# Patient Record
Sex: FEMALE | URBAN - METROPOLITAN AREA
[De-identification: names, ages, dates, MRNs, and addresses within clinical notes are randomized per-mention and may not be internally consistent; named-entity substitution may affect disease eponyms.]

---

## 2022-04-20 ENCOUNTER — APPOINTMENT (OUTPATIENT)
Age: 32
Setting detail: DERMATOLOGY
End: 2022-04-20

## 2022-04-20 ENCOUNTER — APPOINTMENT (RX ONLY)
Dept: URBAN - METROPOLITAN AREA CLINIC 75 | Facility: CLINIC | Age: 32
Setting detail: DERMATOLOGY
End: 2022-04-20

## 2022-04-20 ENCOUNTER — APPOINTMENT (RX ONLY)
Dept: URBAN - METROPOLITAN AREA CLINIC 167 | Facility: CLINIC | Age: 32
Setting detail: DERMATOLOGY
End: 2022-04-20

## 2022-04-20 DIAGNOSIS — L20.89 OTHER ATOPIC DERMATITIS: ICD-10-CM

## 2022-04-20 DIAGNOSIS — L20.89 OTHER ATOPIC DERMATITIS: ICD-10-CM | Status: INADEQUATELY CONTROLLED

## 2022-04-20 PROBLEM — L20.84 INTRINSIC (ALLERGIC) ECZEMA: Status: ACTIVE | Noted: 2022-04-20

## 2022-04-20 PROCEDURE — 99214 OFFICE O/P EST MOD 30 MIN: CPT

## 2022-04-20 PROCEDURE — ? PRESCRIPTION MEDICATION MANAGEMENT

## 2022-04-20 PROCEDURE — ? ADDITIONAL NOTES

## 2022-04-20 PROCEDURE — ? PRESCRIPTION

## 2022-04-20 PROCEDURE — ? COUNSELING

## 2022-04-20 RX ORDER — TACROLIMUS 1 MG/G
OINTMENT TOPICAL BID
Qty: 60 | Refills: 1 | Status: ERX

## 2022-04-20 ASSESSMENT — LOCATION SIMPLE DESCRIPTION DERM
LOCATION SIMPLE: UPPER BACK
LOCATION SIMPLE: UPPER BACK
LOCATION SIMPLE: LEFT THIGH
LOCATION SIMPLE: LEFT THIGH
LOCATION SIMPLE: LEFT BREAST
LOCATION SIMPLE: RIGHT UPPER ARM
LOCATION SIMPLE: RIGHT UPPER ARM
LOCATION SIMPLE: LEFT UPPER ARM
LOCATION SIMPLE: LEFT THIGH
LOCATION SIMPLE: RIGHT THIGH
LOCATION SIMPLE: RIGHT THIGH
LOCATION SIMPLE: LEFT UPPER ARM
LOCATION SIMPLE: UPPER BACK
LOCATION SIMPLE: RIGHT THIGH
LOCATION SIMPLE: RIGHT UPPER ARM
LOCATION SIMPLE: LEFT BREAST
LOCATION SIMPLE: LEFT BREAST
LOCATION SIMPLE: LEFT UPPER ARM

## 2022-04-20 ASSESSMENT — LOCATION DETAILED DESCRIPTION DERM
LOCATION DETAILED: LEFT ANTERIOR PROXIMAL THIGH
LOCATION DETAILED: INFERIOR THORACIC SPINE
LOCATION DETAILED: RIGHT ANTERIOR DISTAL UPPER ARM
LOCATION DETAILED: RIGHT ANTERIOR DISTAL THIGH
LOCATION DETAILED: RIGHT ANTERIOR DISTAL THIGH
LOCATION DETAILED: INFERIOR THORACIC SPINE
LOCATION DETAILED: LEFT ANTERIOR DISTAL UPPER ARM
LOCATION DETAILED: LEFT ANTERIOR PROXIMAL THIGH
LOCATION DETAILED: LEFT MEDIAL BREAST 10-11:00 REGION
LOCATION DETAILED: RIGHT ANTERIOR DISTAL THIGH
LOCATION DETAILED: LEFT ANTERIOR DISTAL UPPER ARM
LOCATION DETAILED: INFERIOR THORACIC SPINE
LOCATION DETAILED: LEFT ANTERIOR PROXIMAL THIGH
LOCATION DETAILED: LEFT MEDIAL BREAST 10-11:00 REGION
LOCATION DETAILED: LEFT ANTERIOR DISTAL UPPER ARM
LOCATION DETAILED: RIGHT ANTERIOR DISTAL UPPER ARM
LOCATION DETAILED: RIGHT ANTERIOR DISTAL UPPER ARM
LOCATION DETAILED: LEFT MEDIAL BREAST 10-11:00 REGION

## 2022-04-20 ASSESSMENT — LOCATION ZONE DERM
LOCATION ZONE: LEG
LOCATION ZONE: ARM
LOCATION ZONE: TRUNK
LOCATION ZONE: TRUNK
LOCATION ZONE: ARM
LOCATION ZONE: ARM
LOCATION ZONE: TRUNK

## 2022-04-20 NOTE — HPI: RASH
How Severe Is Your Rash?: mild
Is This A New Presentation, Or A Follow-Up?: Rash
Additional History: Diagnosed as a child with eczema

## 2022-04-20 NOTE — PROCEDURE: PRESCRIPTION MEDICATION MANAGEMENT
Detail Level: Zone
Plan: Rotate protopic and triamcinalone. Protopic bid and triamcinalone for acute flares bid x 2 weeks.  Will follow up in 1 month
Render In Strict Bullet Format?: No

## 2022-06-01 ENCOUNTER — APPOINTMENT (OUTPATIENT)
Age: 32
Setting detail: DERMATOLOGY
End: 2022-06-01

## 2022-06-01 ENCOUNTER — APPOINTMENT (RX ONLY)
Dept: URBAN - METROPOLITAN AREA CLINIC 75 | Facility: CLINIC | Age: 32
Setting detail: DERMATOLOGY
End: 2022-06-01

## 2022-06-01 ENCOUNTER — APPOINTMENT (RX ONLY)
Dept: URBAN - METROPOLITAN AREA CLINIC 167 | Facility: CLINIC | Age: 32
Setting detail: DERMATOLOGY
End: 2022-06-01

## 2022-06-01 DIAGNOSIS — L20.89 OTHER ATOPIC DERMATITIS: ICD-10-CM

## 2022-06-01 DIAGNOSIS — L20.89 OTHER ATOPIC DERMATITIS: ICD-10-CM | Status: WELL CONTROLLED

## 2022-06-01 PROBLEM — L20.84 INTRINSIC (ALLERGIC) ECZEMA: Status: ACTIVE | Noted: 2022-06-01

## 2022-06-01 PROCEDURE — ? PRESCRIPTION

## 2022-06-01 PROCEDURE — ? COUNSELING

## 2022-06-01 PROCEDURE — 99213 OFFICE O/P EST LOW 20 MIN: CPT

## 2022-06-01 PROCEDURE — ? CHRONOLOGY OF PRESENT ILLNESS

## 2022-06-01 RX ORDER — TRIAMCINOLONE ACETONIDE 1 MG/G
CREAM TOPICAL BID
Qty: 80 | Refills: 2 | Status: ERX

## 2022-06-01 ASSESSMENT — LOCATION SIMPLE DESCRIPTION DERM
LOCATION SIMPLE: LEFT UPPER ARM
LOCATION SIMPLE: RIGHT UPPER ARM
LOCATION SIMPLE: LEFT UPPER ARM
LOCATION SIMPLE: LEFT UPPER ARM
LOCATION SIMPLE: RIGHT UPPER ARM
LOCATION SIMPLE: RIGHT UPPER ARM

## 2022-06-01 ASSESSMENT — LOCATION DETAILED DESCRIPTION DERM
LOCATION DETAILED: RIGHT ANTECUBITAL SKIN
LOCATION DETAILED: RIGHT ANTECUBITAL SKIN
LOCATION DETAILED: LEFT ANTECUBITAL SKIN
LOCATION DETAILED: RIGHT ANTECUBITAL SKIN
LOCATION DETAILED: LEFT ANTECUBITAL SKIN
LOCATION DETAILED: LEFT ANTECUBITAL SKIN

## 2022-06-01 ASSESSMENT — LOCATION ZONE DERM
LOCATION ZONE: ARM

## 2022-06-01 NOTE — PROCEDURE: CHRONOLOGY OF PRESENT ILLNESS
Detail Level: Zone
Chronology Of Present Illness: 4/20/22 : Patient notes she was diagnosed with eczema as a child and she never grew out of it. She is currently Galeno Plus and states she is applying TAC and eucrisa everyday (spot treating). we discussed in detail: free\\nand clear products, no drier sheets, and the importance of emollient use. Patient to avoid hot showers and apply\\ncerave or cetaphil cream immediately after showering. Instructed to avoid using TAC every day due to skin\\natrophy. Discussed options of treatment moving forward including adding protopic or dupixent. Patient notes they\\nwould like to start a family soon so will hold \\n\\n6/1/22 Patient is controlled on current topicals. She notes she has only had to use triamcinolone a handful of times to spot treat areas. She wants to continue on current regimen. Will refill Triamcinalone and re-check in 3 months.  Patient is to return sooner if flares are not manageable on current regimen

## 2022-09-15 NOTE — PROCEDURE: ADDITIONAL NOTES
How Severe Is Your Cyst?: moderate
Is This A New Presentation, Or A Follow-Up?: Cyst
Additional Notes: Patient notes she was diagnosed with eczema as a child and she never grew out of it. She is currently washing with Dove soap and states she is applying TAC and eucrisa everyday (spot treating). we discussed in detail: free and clear products, no drier sheets, and the importance of emollient use. Patient to avoid hot showers and apply cerave or cetaphil cream immediately after showering. Instructed to avoid using TAC every day due to skin atrophy. Discussed options of treatment moving forward including adding protopic or dupixent. Patient notes they would like to start a family soon so will hold off on dupixent. We also discussed light box therapy if not controlled on topicals. Will reassess in 1 month.
Detail Level: Zone
Render Risk Assessment In Note?: no

## 2022-09-20 ENCOUNTER — APPOINTMENT (OUTPATIENT)
Age: 32
Setting detail: DERMATOLOGY
End: 2022-09-20

## 2022-09-20 ENCOUNTER — APPOINTMENT (RX ONLY)
Dept: URBAN - METROPOLITAN AREA CLINIC 167 | Facility: CLINIC | Age: 32
Setting detail: DERMATOLOGY
End: 2022-09-20

## 2022-09-20 ENCOUNTER — APPOINTMENT (RX ONLY)
Dept: URBAN - METROPOLITAN AREA CLINIC 75 | Facility: CLINIC | Age: 32
Setting detail: DERMATOLOGY
End: 2022-09-20

## 2022-09-20 DIAGNOSIS — L20.89 OTHER ATOPIC DERMATITIS: ICD-10-CM

## 2022-09-20 DIAGNOSIS — L20.89 OTHER ATOPIC DERMATITIS: ICD-10-CM | Status: INADEQUATELY CONTROLLED

## 2022-09-20 PROBLEM — L20.84 INTRINSIC (ALLERGIC) ECZEMA: Status: ACTIVE | Noted: 2022-09-20

## 2022-09-20 PROCEDURE — ? COUNSELING

## 2022-09-20 PROCEDURE — ? PRESCRIPTION

## 2022-09-20 PROCEDURE — ? PRESCRIPTION MEDICATION MANAGEMENT

## 2022-09-20 PROCEDURE — ? CHRONOLOGY OF PRESENT ILLNESS

## 2022-09-20 PROCEDURE — 99214 OFFICE O/P EST MOD 30 MIN: CPT

## 2022-09-20 RX ORDER — TRIAMCINOLONE ACETONIDE 1 MG/G
THIN LAYER CREAM TOPICAL BID
Qty: 453.6 | Refills: 1 | Status: ERX

## 2022-09-20 RX ORDER — DESONIDE 0.5 MG/G
THIN LAYER CREAM TOPICAL BID
Qty: 15 | Refills: 3 | Status: ERX | COMMUNITY
Start: 2022-09-20

## 2022-09-20 RX ORDER — CLOBETASOL PROPIONATE 0.5 MG/ML
2-3 DROPS SOLUTION TOPICAL BID
Qty: 50 | Refills: 2 | Status: ERX | COMMUNITY
Start: 2022-09-20

## 2022-09-20 RX ADMIN — CLOBETASOL PROPIONATE 2-3 DROPS: 0.5 SOLUTION TOPICAL at 00:00

## 2022-09-20 RX ADMIN — DESONIDE THIN LAYER: 0.5 CREAM TOPICAL at 00:00

## 2022-09-20 ASSESSMENT — LOCATION SIMPLE DESCRIPTION DERM
LOCATION SIMPLE: LEFT CHEEK
LOCATION SIMPLE: ABDOMEN
LOCATION SIMPLE: LEFT UPPER ARM
LOCATION SIMPLE: LEFT THIGH
LOCATION SIMPLE: ABDOMEN
LOCATION SIMPLE: RIGHT THIGH
LOCATION SIMPLE: LEFT CHEEK
LOCATION SIMPLE: LEFT THIGH
LOCATION SIMPLE: RIGHT UPPER ARM
LOCATION SIMPLE: RIGHT THIGH
LOCATION SIMPLE: LEFT THIGH
LOCATION SIMPLE: RIGHT UPPER ARM
LOCATION SIMPLE: LEFT UPPER ARM
LOCATION SIMPLE: RIGHT THIGH
LOCATION SIMPLE: LEFT UPPER ARM
LOCATION SIMPLE: LEFT CHEEK
LOCATION SIMPLE: RIGHT UPPER ARM
LOCATION SIMPLE: ABDOMEN

## 2022-09-20 ASSESSMENT — LOCATION ZONE DERM
LOCATION ZONE: LEG
LOCATION ZONE: ARM
LOCATION ZONE: ARM
LOCATION ZONE: LEG
LOCATION ZONE: TRUNK
LOCATION ZONE: TRUNK
LOCATION ZONE: ARM
LOCATION ZONE: FACE
LOCATION ZONE: FACE
LOCATION ZONE: LEG
LOCATION ZONE: TRUNK
LOCATION ZONE: FACE

## 2022-09-20 ASSESSMENT — LOCATION DETAILED DESCRIPTION DERM
LOCATION DETAILED: RIGHT ANTECUBITAL SKIN
LOCATION DETAILED: EPIGASTRIC SKIN
LOCATION DETAILED: RIGHT ANTECUBITAL SKIN
LOCATION DETAILED: LEFT ANTERIOR PROXIMAL THIGH
LOCATION DETAILED: LEFT ANTECUBITAL SKIN
LOCATION DETAILED: LEFT ANTECUBITAL SKIN
LOCATION DETAILED: RIGHT ANTERIOR DISTAL THIGH
LOCATION DETAILED: LEFT INFERIOR CENTRAL MALAR CHEEK
LOCATION DETAILED: EPIGASTRIC SKIN
LOCATION DETAILED: LEFT ANTERIOR PROXIMAL THIGH
LOCATION DETAILED: LEFT ANTERIOR PROXIMAL THIGH
LOCATION DETAILED: RIGHT ANTERIOR DISTAL THIGH
LOCATION DETAILED: LEFT INFERIOR CENTRAL MALAR CHEEK
LOCATION DETAILED: RIGHT ANTECUBITAL SKIN
LOCATION DETAILED: EPIGASTRIC SKIN
LOCATION DETAILED: RIGHT ANTERIOR DISTAL THIGH
LOCATION DETAILED: LEFT ANTECUBITAL SKIN
LOCATION DETAILED: LEFT INFERIOR CENTRAL MALAR CHEEK

## 2022-09-20 ASSESSMENT — BSA RASH
BSA RASH: 20

## 2022-09-20 ASSESSMENT — SEVERITY ASSESSMENT 2020
SEVERITY 2020: SEVERE

## 2022-09-20 NOTE — PROCEDURE: CHRONOLOGY OF PRESENT ILLNESS
Chronology Of Present Illness: 4/20/22 : Longstanding history of eczema since childhood. She is currently washing with Dove soap and states she is applying TAC and eucrisa everyday (spot treating). we discussed in detail: free\\nand clear products, no drier sheets, and the importance of emollient use. Patient to avoid hot showers and apply\\ncerave or cetaphil cream immediately after showering. Instructed to avoid using TAC every day due to skin\\natrophy. Discussed options of treatment moving forward including adding protopic or dupixent. Patient notes they\\nwould like to start a family soon so will hold \\n\\n6/1/22 Patient is controlled on current topicals. She notes she has only had to use triamcinolone a handful of times to spot treat areas. She wants to continue on current regimen. Will refill Triamcinolone and re-check in 3 months. Patient is to return sooner if flares are not manageable on current regimen\\n\\n9/20/22\\nEczema inadequately controlled. Patient reports worsening flare with pregnancy (currently 13 weeks pregnant). \\nPlan- continue diligent emollient use, desonide to face, triamcinolone or clobetasol (mixed in vanicream) to trunk and extremties. Discussed adding Eucrisa but would need clearance from OB.
Detail Level: Zone

## 2022-09-20 NOTE — PROCEDURE: PRESCRIPTION MEDICATION MANAGEMENT
Discontinue Regimen: Protopic
Render In Strict Bullet Format?: No
Detail Level: Detailed
Plan: Diligent emollient use twice daily or more often as needed\\nWash with minimal, gentle, unscented soap to axilla, groin, feet only\\nWatch for triggers. Avoid irritants. \\n\\nFor flares to face- desonide BID up to 2 weeks per month \\nFor flares to trunk and extremities- triamcinolone or Clobetasol BID up to 2 weeks per month

## 2022-10-04 ENCOUNTER — APPOINTMENT (RX ONLY)
Dept: URBAN - METROPOLITAN AREA CLINIC 75 | Facility: CLINIC | Age: 32
Setting detail: DERMATOLOGY
End: 2022-10-04

## 2022-10-04 DIAGNOSIS — L21.8 OTHER SEBORRHEIC DERMATITIS: ICD-10-CM | Status: INADEQUATELY CONTROLLED

## 2022-10-04 DIAGNOSIS — L20.89 OTHER ATOPIC DERMATITIS: ICD-10-CM | Status: IMPROVED

## 2022-10-04 PROBLEM — L20.84 INTRINSIC (ALLERGIC) ECZEMA: Status: ACTIVE | Noted: 2022-10-04

## 2022-10-04 PROCEDURE — 99214 OFFICE O/P EST MOD 30 MIN: CPT

## 2022-10-04 PROCEDURE — ? CHRONOLOGY OF PRESENT ILLNESS

## 2022-10-04 PROCEDURE — ? COUNSELING

## 2022-10-04 PROCEDURE — ? PRESCRIPTION MEDICATION MANAGEMENT

## 2022-10-04 PROCEDURE — ? PRESCRIPTION

## 2022-10-04 RX ORDER — KETOCONAZOLE 20 MG/ML
THIN LAYER SHAMPOO, SUSPENSION TOPICAL QD
Qty: 120 | Refills: 11 | Status: ERX | COMMUNITY
Start: 2022-10-04

## 2022-10-04 RX ADMIN — KETOCONAZOLE THIN LAYER: 20 SHAMPOO, SUSPENSION TOPICAL at 00:00

## 2022-10-04 ASSESSMENT — LOCATION DETAILED DESCRIPTION DERM
LOCATION DETAILED: RIGHT ANTECUBITAL SKIN
LOCATION DETAILED: LEFT INFERIOR CENTRAL MALAR CHEEK
LOCATION DETAILED: RIGHT ANTERIOR DISTAL THIGH
LOCATION DETAILED: LEFT ANTECUBITAL SKIN
LOCATION DETAILED: MID-FRONTAL SCALP
LOCATION DETAILED: LEFT ANTERIOR PROXIMAL THIGH
LOCATION DETAILED: EPIGASTRIC SKIN

## 2022-10-04 ASSESSMENT — LOCATION SIMPLE DESCRIPTION DERM
LOCATION SIMPLE: RIGHT THIGH
LOCATION SIMPLE: LEFT CHEEK
LOCATION SIMPLE: ANTERIOR SCALP
LOCATION SIMPLE: LEFT THIGH
LOCATION SIMPLE: ABDOMEN
LOCATION SIMPLE: RIGHT UPPER ARM
LOCATION SIMPLE: LEFT UPPER ARM

## 2022-10-04 ASSESSMENT — LOCATION ZONE DERM
LOCATION ZONE: ARM
LOCATION ZONE: LEG
LOCATION ZONE: FACE
LOCATION ZONE: TRUNK
LOCATION ZONE: SCALP

## 2022-10-04 NOTE — PROCEDURE: CHRONOLOGY OF PRESENT ILLNESS
Chronology Of Present Illness: 4/20/22 : Longstanding history of eczema since childhood. She is currently washing with Dove soap and states she is applying TAC and eucrisa everyday (spot treating). we discussed in detail: free\\nand clear products, no drier sheets, and the importance of emollient use. Patient to avoid hot showers and apply\\ncerave or cetaphil cream immediately after showering. Instructed to avoid using TAC every day due to skin\\natrophy. Discussed options of treatment moving forward including adding protopic or dupixent. Patient notes they\\nwould like to start a family soon so will hold \\n\\n6/1/22 Patient is controlled on current topicals. She notes she has only had to use triamcinolone a handful of times to spot treat areas. She wants to continue on current regimen. Will refill Triamcinolone and re-check in 3 months. Patient is to return sooner if flares are not manageable on current regimen\\n\\n9/20/22\\nEczema inadequately controlled. Patient reports worsening flare with pregnancy (currently 13 weeks pregnant). \\nPlan- continue diligent emollient use, desonide to face, triamcinolone or clobetasol (mixed in vanicream) to trunk and extremties. Discussed adding Eucrisa but would need clearance from OB. \\n\\n10/4/22\\nSignificant improvement on todays exam. Experiences minimal flares/itching. Patient has been using rx as prescribed. Advised to continue current regimen. Advised to continue desonide on face for 1 more week. No refills needed. Patient to f/u in 1 month to re-assess.
Detail Level: Zone

## 2022-10-04 NOTE — PROCEDURE: PRESCRIPTION MEDICATION MANAGEMENT
Render In Strict Bullet Format?: No
Detail Level: Detailed
Plan: Diligent emollient use twice daily or more often as needed\\nWash with minimal, gentle, unscented soap to axilla, groin, feet only\\nWatch for triggers. Avoid irritants. \\n\\nFor flares to face- desonide BID up to 2 weeks per month \\nFor flares to trunk and extremities- triamcinolone or Clobetasol BID up to 2 weeks per month
Initiate Treatment: Ketoconazole shampoo - Thin layer to scalp 3-4x/week until clear, then 1x/week for maintenance
Detail Level: Zone

## 2022-11-04 ENCOUNTER — APPOINTMENT (RX ONLY)
Dept: URBAN - METROPOLITAN AREA CLINIC 75 | Facility: CLINIC | Age: 32
Setting detail: DERMATOLOGY
End: 2022-11-04

## 2022-11-04 DIAGNOSIS — L20.89 OTHER ATOPIC DERMATITIS: ICD-10-CM | Status: WELL CONTROLLED

## 2022-11-04 PROBLEM — L20.84 INTRINSIC (ALLERGIC) ECZEMA: Status: ACTIVE | Noted: 2022-11-04

## 2022-11-04 PROCEDURE — 99213 OFFICE O/P EST LOW 20 MIN: CPT

## 2022-11-04 PROCEDURE — ? CHRONOLOGY OF PRESENT ILLNESS

## 2022-11-04 PROCEDURE — ? COUNSELING

## 2022-11-04 PROCEDURE — ? PRESCRIPTION MEDICATION MANAGEMENT

## 2022-11-04 ASSESSMENT — LOCATION SIMPLE DESCRIPTION DERM
LOCATION SIMPLE: RIGHT UPPER ARM
LOCATION SIMPLE: RIGHT THIGH
LOCATION SIMPLE: LEFT UPPER ARM
LOCATION SIMPLE: LEFT THIGH
LOCATION SIMPLE: ABDOMEN
LOCATION SIMPLE: LEFT CHEEK

## 2022-11-04 ASSESSMENT — LOCATION ZONE DERM
LOCATION ZONE: TRUNK
LOCATION ZONE: LEG
LOCATION ZONE: FACE
LOCATION ZONE: ARM

## 2022-11-04 ASSESSMENT — LOCATION DETAILED DESCRIPTION DERM
LOCATION DETAILED: RIGHT ANTECUBITAL SKIN
LOCATION DETAILED: LEFT INFERIOR CENTRAL MALAR CHEEK
LOCATION DETAILED: LEFT ANTECUBITAL SKIN
LOCATION DETAILED: RIGHT ANTERIOR DISTAL THIGH
LOCATION DETAILED: EPIGASTRIC SKIN
LOCATION DETAILED: LEFT ANTERIOR PROXIMAL THIGH

## 2022-11-04 NOTE — PROCEDURE: PRESCRIPTION MEDICATION MANAGEMENT
Render In Strict Bullet Format?: No
Detail Level: Detailed
Plan: Diligent emollient use twice daily or more often as needed\\nWash with minimal, gentle, unscented soap to axilla, groin, feet only\\nWatch for triggers. Avoid irritants.  Keep skin moisturized as prevention consistently (Vanicream)\\n\\nTAC 0.05% - cream on trunk + extremities PRN not to be used more than BID x 2 weeks/ month

## 2022-11-04 NOTE — PROCEDURE: CHRONOLOGY OF PRESENT ILLNESS
Chronology Of Present Illness: 4/20/22 : Longstanding history of eczema since childhood. She is currently washing with Dove soap and states she is applying TAC and eucrisa everyday (spot treating). we discussed in detail: free\\nand clear products, no drier sheets, and the importance of emollient use. Patient to avoid hot showers and apply\\ncerave or cetaphil cream immediately after showering. Instructed to avoid using TAC every day due to skin\\natrophy. Discussed options of treatment moving forward including adding protopic or dupixent. Patient notes they\\nwould like to start a family soon so will hold \\n\\n6/1/22 Patient is controlled on current topicals. She notes she has only had to use triamcinolone a handful of times to spot treat areas. She wants to continue on current regimen. Will refill Triamcinolone and re-check in 3 months. Patient is to return sooner if flares are not manageable on current regimen\\n\\n9/20/22\\nEczema inadequately controlled. Patient reports worsening flare with pregnancy (currently 13 weeks pregnant). \\nPlan- continue diligent emollient use, desonide to face, triamcinolone or clobetasol (mixed in vanicream) to trunk and extremties. Discussed adding Eucrisa but would need clearance from OB. \\n\\n10/4/22\\nSignificant improvement on todays exam. Experiences minimal flares/itching. Patient has been using rx as prescribed. Advised to continue current regimen. Advised to continue desonide on face for 1 more week. No refills needed. Patient to f/u in 1 month to re-assess. \\n\\n11/4/22\\nWell controlled today. Advised to moisturize BID daily for prevention. Reviewed pulsed topical steroid use for flares only and to RTC if symptoms poorly controlled.
Detail Level: Zone

## 2024-02-07 ENCOUNTER — APPOINTMENT (RX ONLY)
Dept: URBAN - METROPOLITAN AREA CLINIC 75 | Facility: CLINIC | Age: 34
Setting detail: DERMATOLOGY
End: 2024-02-07

## 2024-02-07 DIAGNOSIS — L21.8 OTHER SEBORRHEIC DERMATITIS: ICD-10-CM | Status: INADEQUATELY CONTROLLED

## 2024-02-07 DIAGNOSIS — L20.89 OTHER ATOPIC DERMATITIS: ICD-10-CM | Status: WELL CONTROLLED

## 2024-02-07 PROCEDURE — ? PRESCRIPTION MEDICATION MANAGEMENT

## 2024-02-07 PROCEDURE — 99214 OFFICE O/P EST MOD 30 MIN: CPT

## 2024-02-07 PROCEDURE — ? COUNSELING

## 2024-02-07 PROCEDURE — ? CHRONOLOGY OF PRESENT ILLNESS

## 2024-02-07 PROCEDURE — ? PRESCRIPTION

## 2024-02-07 RX ORDER — CRISABOROLE 20 MG/G
THIN LAYER OINTMENT TOPICAL BID
Qty: 60 | Refills: 5 | Status: ERX | COMMUNITY
Start: 2024-02-07

## 2024-02-07 RX ORDER — KETOCONAZOLE 20 MG/G
AS DIRECTED CREAM TOPICAL BID
Qty: 30 | Refills: 2 | Status: ERX | COMMUNITY
Start: 2024-02-07

## 2024-02-07 RX ORDER — CLOBETASOL PROPIONATE 0.5 MG/ML
AS DIRECTED SOLUTION TOPICAL BID
Qty: 50 | Refills: 3 | Status: CANCELLED
Stop reason: ENTERED-IN-ERROR

## 2024-02-07 RX ADMIN — CRISABOROLE THIN LAYER: 20 OINTMENT TOPICAL at 00:00

## 2024-02-07 RX ADMIN — KETOCONAZOLE AS DIRECTED: 20 CREAM TOPICAL at 00:00

## 2024-02-07 ASSESSMENT — LOCATION DETAILED DESCRIPTION DERM
LOCATION DETAILED: RIGHT SUPERIOR FOREHEAD
LOCATION DETAILED: LEFT SUPERIOR FOREHEAD
LOCATION DETAILED: LEFT INFERIOR CENTRAL MALAR CHEEK

## 2024-02-07 ASSESSMENT — LOCATION SIMPLE DESCRIPTION DERM
LOCATION SIMPLE: LEFT FOREHEAD
LOCATION SIMPLE: RIGHT FOREHEAD
LOCATION SIMPLE: LEFT CHEEK

## 2024-02-07 ASSESSMENT — LOCATION ZONE DERM: LOCATION ZONE: FACE

## 2024-02-07 NOTE — PROCEDURE: PRESCRIPTION MEDICATION MANAGEMENT
Render In Strict Bullet Format?: No
Detail Level: Detailed
Plan: Advised patient to continue to use emollient use twice daily or more often as needed\\nWash with minimal, gentle, unscented soap to axilla, groin, feet only\\nWatch for triggers. Avoid irritants. \\n\\nFor flares to face: desonide bid for up to 2 weeks per month\\nTrunk and extremities: TAC or clobetasol bid for up to 2 weeks per month as needed for flares \\n\\nAfter flare: Eucrisa bid for maintenance
Plan: Alternate ketoconazole shampoo and T-sal shampoo \\nketoconazole cream to hairline bid as needed
Detail Level: Zone

## 2024-02-07 NOTE — PROCEDURE: CHRONOLOGY OF PRESENT ILLNESS
Chronology Of Present Illness: Longstanding history of eczema since childhood. New diagnosis of Sjogren's Disease (2023)\\n Well controlled with diligent emollient use and topical steroids.
Detail Level: Zone

## 2024-12-10 ENCOUNTER — APPOINTMENT (OUTPATIENT)
Dept: URBAN - METROPOLITAN AREA CLINIC 75 | Facility: CLINIC | Age: 34
Setting detail: DERMATOLOGY
End: 2024-12-10

## 2024-12-10 DIAGNOSIS — L21.8 OTHER SEBORRHEIC DERMATITIS: ICD-10-CM

## 2024-12-10 DIAGNOSIS — L20.89 OTHER ATOPIC DERMATITIS: ICD-10-CM | Status: INADEQUATELY CONTROLLED

## 2024-12-10 PROCEDURE — ? COUNSELING

## 2024-12-10 PROCEDURE — ? PRESCRIPTION MEDICATION MANAGEMENT

## 2024-12-10 PROCEDURE — ? CHRONOLOGY OF PRESENT ILLNESS

## 2024-12-10 PROCEDURE — ? PRESCRIPTION

## 2024-12-10 PROCEDURE — 99214 OFFICE O/P EST MOD 30 MIN: CPT

## 2024-12-10 RX ORDER — ROFLUMILAST 1.5 MG/G
THIN LAYER CREAM TOPICAL QD
Qty: 60 | Refills: 10 | Status: ERX | COMMUNITY
Start: 2024-12-10

## 2024-12-10 RX ORDER — KETOCONAZOLE 20 MG/ML
SHAMPOO, SUSPENSION TOPICAL QD
Qty: 120 | Refills: 11 | Status: ERX | COMMUNITY
Start: 2024-12-10

## 2024-12-10 RX ORDER — CLOBETASOL PROPIONATE 0.5 MG/G
THIN LAYER CREAM TOPICAL BID X 2 WEEKS
Qty: 60 | Refills: 2 | Status: ERX | COMMUNITY
Start: 2024-12-10

## 2024-12-10 RX ORDER — DESONIDE 0.5 MG/G
THIN LAYER CREAM TOPICAL BID
Qty: 15 | Refills: 3 | Status: ERX | COMMUNITY
Start: 2024-12-10

## 2024-12-10 RX ORDER — KETOCONAZOLE 20 MG/G
AS DIRECTED CREAM TOPICAL BID
Qty: 30 | Refills: 2 | Status: CANCELLED
Stop reason: ENTERED-IN-ERROR

## 2024-12-10 RX ADMIN — CLOBETASOL PROPIONATE THIN LAYER: 0.5 CREAM TOPICAL at 00:00

## 2024-12-10 RX ADMIN — DESONIDE THIN LAYER: 0.5 CREAM TOPICAL at 00:00

## 2024-12-10 RX ADMIN — ROFLUMILAST THIN LAYER: 1.5 CREAM TOPICAL at 00:00

## 2024-12-10 RX ADMIN — KETOCONAZOLE THIN LAYER: 20 SHAMPOO, SUSPENSION TOPICAL at 00:00

## 2024-12-10 ASSESSMENT — SEVERITY ASSESSMENT 2020: SEVERITY 2020: SEVERE

## 2024-12-10 ASSESSMENT — LOCATION DETAILED DESCRIPTION DERM
LOCATION DETAILED: LEFT INFERIOR CENTRAL MALAR CHEEK
LOCATION DETAILED: LEFT SUPERIOR FOREHEAD
LOCATION DETAILED: RIGHT SUPERIOR FOREHEAD

## 2024-12-10 ASSESSMENT — LOCATION SIMPLE DESCRIPTION DERM
LOCATION SIMPLE: LEFT CHEEK
LOCATION SIMPLE: RIGHT FOREHEAD
LOCATION SIMPLE: LEFT FOREHEAD

## 2024-12-10 ASSESSMENT — BSA ECZEMA: % BODY COVERED IN ECZEMA: 20

## 2024-12-10 ASSESSMENT — LOCATION ZONE DERM: LOCATION ZONE: FACE

## 2024-12-10 NOTE — PROCEDURE: PRESCRIPTION MEDICATION MANAGEMENT
Render In Strict Bullet Format?: No
Detail Level: Detailed
Plan: Advised patient to continue to use emollient use twice daily or more often as needed\\nWash with minimal, gentle, unscented soap to axilla, groin, feet only\\nWatch for triggers. Avoid irritants. \\n\\nFor flares to face: desonide bid for up to 2 weeks per month\\nTrunk and extremities: TAC or clobetasol bid for up to 2 weeks per month as needed for flares \\n\\nAfter flare: Eucrisa bid for maintenance

## 2024-12-10 NOTE — PROCEDURE: CHRONOLOGY OF PRESENT ILLNESS
Chronology Of Present Illness: Longstanding history of eczema since childhood with asthma and seasonal allergies during childhood. New diagnosis of Sjogren's Disease (2023) and keratoconjunctivitis sicca.\\n\\n12/10/24\\nPatient presents with persistent eczema on her face, trunk, and extremities. Face with the most severe flare. She has not been seen in office in 10 months. She is currently using several products on her face that may exacerbate eczema including hyaluronic acid and amlactin. She has been out of medications. Will restart Desonide BID x2 weeks on her face. After the 2 weeks, start Zoryve QD x2 weeks. Will add refills of Clobetasol to apply to AA of body BID x2 weeks and then Zoryve QD x2 weeks on the rest of her body. Patient to moisturize face and body with Vanicream BID. Counseled patient on Adbry, which has lowest risk of conjunctivitis. Patient will f/u in 3-4 weeks.
Detail Level: Zone

## 2025-01-09 ENCOUNTER — APPOINTMENT (OUTPATIENT)
Dept: URBAN - METROPOLITAN AREA CLINIC 75 | Facility: CLINIC | Age: 35
Setting detail: DERMATOLOGY
End: 2025-01-09

## 2025-01-09 DIAGNOSIS — L20.89 OTHER ATOPIC DERMATITIS: ICD-10-CM

## 2025-01-09 PROCEDURE — ? CHRONOLOGY OF PRESENT ILLNESS

## 2025-01-09 PROCEDURE — 99214 OFFICE O/P EST MOD 30 MIN: CPT

## 2025-01-09 PROCEDURE — ? PRESCRIPTION MEDICATION MANAGEMENT

## 2025-01-09 PROCEDURE — ? COUNSELING

## 2025-01-09 ASSESSMENT — SEVERITY ASSESSMENT 2020: SEVERITY 2020: MODERATE

## 2025-01-09 ASSESSMENT — LOCATION ZONE DERM: LOCATION ZONE: FACE

## 2025-01-09 ASSESSMENT — BSA ECZEMA: % BODY COVERED IN ECZEMA: 20

## 2025-01-09 ASSESSMENT — ITCH NUMERIC RATING SCALE: HOW SEVERE IS YOUR ITCHING?: 3

## 2025-01-09 ASSESSMENT — LOCATION DETAILED DESCRIPTION DERM: LOCATION DETAILED: LEFT INFERIOR CENTRAL MALAR CHEEK

## 2025-01-09 ASSESSMENT — LOCATION SIMPLE DESCRIPTION DERM: LOCATION SIMPLE: LEFT CHEEK

## 2025-01-09 NOTE — PROCEDURE: CHRONOLOGY OF PRESENT ILLNESS
Chronology Of Present Illness: Longstanding history of eczema since childhood with asthma and seasonal allergies during childhood. New diagnosis of Sjogren's Disease (2023) and keratoconjunctivitis sicca.\\n\\n12/10/24\\nPatient presents with persistent eczema on her face, trunk, and extremities. Face with the most severe flare. She has not been seen in office in 10 months. She is currently using several products on her face that may exacerbate eczema including hyaluronic acid and amlactin. She has been out of medications. Will restart Desonide BID x2 weeks on her face. After the 2 weeks, start Zoryve QD x2 weeks. Will add refills of Clobetasol to apply to AA of body BID x2 weeks and then Zoryve QD x2 weeks on the rest of her body. Patient to moisturize face and body with Vanicream BID. Counseled patient on Adbry, which has lower risk of conjunctivitis. Patient will f/u in 3-4 weeks.\\n\\n1/9/24 Improved with topical steroids and zoryve. Rheumatologist cleared patient for starting Adbry. Will submit paperwork. Recheck in 8 and then 16 weeks.
Detail Level: Zone

## 2025-01-09 NOTE — PROCEDURE: PRESCRIPTION MEDICATION MANAGEMENT
Render In Strict Bullet Format?: No
Detail Level: Detailed
Plan: Advised patient to continue to use emollient use twice daily or more often as needed\\nWash with minimal, gentle, unscented soap to axilla, groin, feet only\\nWatch for triggers. Avoid irritants. \\n\\nFor flares to face: desonide bid for up to 2 weeks per month  followed by zoryve once daily x 2 weeks. Repeat if needed\\nTrunk and extremities: TAC or clobetasol bid for up to 2 weeks per month as needed for flares  followed by zoryve once daily x 2 weeks. Repeat if needed\\n\\nPlan to start Adbry

## 2025-01-29 ENCOUNTER — RX ONLY (RX ONLY)
Age: 35
End: 2025-01-29

## 2025-01-29 RX ORDER — TRALOKINUMAB-LDRM 300 MG/2ML
1 PEN INJECTION, SOLUTION SUBCUTANEOUS
Qty: 4 | Refills: 5 | Status: ERX | COMMUNITY
Start: 2025-01-29

## 2025-02-10 ENCOUNTER — RX ONLY (RX ONLY)
Age: 35
End: 2025-02-10

## 2025-02-10 RX ORDER — TRALOKINUMAB-LDRM 300 MG/2ML
1 PEN INJECTION, SOLUTION SUBCUTANEOUS
Qty: 4 | Refills: 5 | Status: ERX

## 2025-03-03 ENCOUNTER — RX ONLY (RX ONLY)
Age: 35
End: 2025-03-03

## 2025-03-03 RX ORDER — TRALOKINUMAB-LDRM 300 MG/2ML
1 PEN INJECTION, SOLUTION SUBCUTANEOUS
Qty: 4 | Refills: 5 | Status: ERX

## 2025-03-12 ENCOUNTER — APPOINTMENT (OUTPATIENT)
Dept: URBAN - METROPOLITAN AREA CLINIC 75 | Facility: CLINIC | Age: 35
Setting detail: DERMATOLOGY
End: 2025-03-12

## 2025-03-12 DIAGNOSIS — L20.89 OTHER ATOPIC DERMATITIS: ICD-10-CM | Status: WELL CONTROLLED

## 2025-03-12 PROCEDURE — ? PRESCRIPTION MEDICATION MANAGEMENT

## 2025-03-12 PROCEDURE — ? COUNSELING

## 2025-03-12 PROCEDURE — 99214 OFFICE O/P EST MOD 30 MIN: CPT

## 2025-03-12 PROCEDURE — ? CHRONOLOGY OF PRESENT ILLNESS

## 2025-03-12 ASSESSMENT — LOCATION ZONE DERM: LOCATION ZONE: FACE

## 2025-03-12 ASSESSMENT — ITCH NUMERIC RATING SCALE: HOW SEVERE IS YOUR ITCHING?: 1

## 2025-03-12 ASSESSMENT — SEVERITY ASSESSMENT 2020: SEVERITY 2020: ALMOST CLEAR

## 2025-03-12 ASSESSMENT — LOCATION SIMPLE DESCRIPTION DERM: LOCATION SIMPLE: LEFT CHEEK

## 2025-03-12 ASSESSMENT — LOCATION DETAILED DESCRIPTION DERM: LOCATION DETAILED: LEFT INFERIOR CENTRAL MALAR CHEEK

## 2025-03-12 NOTE — PROCEDURE: CHRONOLOGY OF PRESENT ILLNESS
Chronology Of Present Illness: Longstanding history of eczema since childhood with asthma and seasonal allergies during childhood. New diagnosis of Sjogren's Disease (2023) and keratoconjunctivitis sicca.\\n\\n12/10/24\\nPatient presents with persistent eczema on her face, trunk, and extremities. Face with the most severe flare. She has not been seen in office in 10 months. She is currently using several products on her face that may exacerbate eczema including hyaluronic acid and amlactin. She has been out of medications. Will restart Desonide BID x2 weeks on her face. After the 2 weeks, start Zoryve QD x2 weeks. Will add refills of Clobetasol to apply to AA of body BID x2 weeks and then Zoryve QD x2 weeks on the rest of her body. Patient to moisturize face and body with Vanicream BID. Counseled patient on Adbry, which has lower risk of conjunctivitis. Patient will f/u in 3-4 weeks.\\n\\n1/9/24 \\nImproved with topical steroids and zoryve. Rheumatologist cleared patient for starting Adbry. Will submit paperwork. Recheck in 8 and then 16 weeks.\\n\\n3/12/25\\nPatient well controlled with triamcinolone, desonide and her Adbry injections. No flares on exam today. Patient was given Adbry sample in January. Patient approved for Adbry by insurance. Patient to administer next injection at home today. Will discuss decreasing to once monthly at next follow up.
Detail Level: Zone

## 2025-03-12 NOTE — PROCEDURE: PRESCRIPTION MEDICATION MANAGEMENT
Render In Strict Bullet Format?: No
Detail Level: Detailed
Plan: Advised patient to continue to use emollient use twice daily or more often as needed\\nWash with minimal, gentle, unscented soap to axilla, groin, feet only\\nWatch for triggers. Avoid irritants. \\n\\nFor flares to face: desonide bid for up to 2 weeks per month \\nTrunk and extremities: TAC or clobetasol bid for up to 2 weeks per month as needed for flares\\nAdbry 300mg/2mL auto injector

## 2025-06-30 ENCOUNTER — APPOINTMENT (OUTPATIENT)
Dept: URBAN - METROPOLITAN AREA CLINIC 75 | Facility: CLINIC | Age: 35
Setting detail: DERMATOLOGY
End: 2025-06-30

## 2025-06-30 DIAGNOSIS — L20.89 OTHER ATOPIC DERMATITIS: ICD-10-CM | Status: WELL CONTROLLED

## 2025-06-30 DIAGNOSIS — B07.8 OTHER VIRAL WARTS: ICD-10-CM

## 2025-06-30 PROCEDURE — ? PRESCRIPTION

## 2025-06-30 PROCEDURE — ? PRESCRIPTION MEDICATION MANAGEMENT

## 2025-06-30 PROCEDURE — ?

## 2025-06-30 PROCEDURE — ? COUNSELING

## 2025-06-30 PROCEDURE — ? CHRONOLOGY OF PRESENT ILLNESS

## 2025-06-30 PROCEDURE — ? ADDITIONAL NOTES

## 2025-06-30 RX ORDER — TAPINAROF 10 MG/1000MG
THIN LAYER CREAM TOPICAL QD
Qty: 60 | Refills: 3 | Status: ERX | COMMUNITY
Start: 2025-06-30

## 2025-06-30 RX ADMIN — TAPINAROF THIN LAYER: 10 CREAM TOPICAL at 00:00

## 2025-06-30 ASSESSMENT — LOCATION ZONE DERM
LOCATION ZONE: LEG
LOCATION ZONE: HAND

## 2025-06-30 ASSESSMENT — LOCATION DETAILED DESCRIPTION DERM
LOCATION DETAILED: LEFT KNEE
LOCATION DETAILED: RIGHT THENAR EMINENCE

## 2025-06-30 ASSESSMENT — LOCATION SIMPLE DESCRIPTION DERM
LOCATION SIMPLE: RIGHT HAND
LOCATION SIMPLE: LEFT KNEE

## 2025-06-30 NOTE — PROCEDURE: PRESCRIPTION MEDICATION MANAGEMENT
Render In Strict Bullet Format?: No
Samples Given: Adbry sample given to patient, lot number: [id number], exp: 5/25
Detail Level: Detailed
Plan: Adbry 300mg/2mL auto injector, 1 every 2 weeks\\nVtama 1 % topical cream QD - Apply thin layer to AA of body QD
Detail Level: Zone
Plan: Diligent emollient use twice daily\\nWatch for triggers. Avoid irritants. Consider removing nail polish x 6 weeks. \\n\\nFor flares on trunk and extremities: TAC  bid for up to 2 weeks per month followed by VTAMA QD \\n\\nFor flares on face: desonide bid up to 2 weeks followed by VTAMA QD

## 2025-06-30 NOTE — PROCEDURE: CHRONOLOGY OF PRESENT ILLNESS
Chronology Of Present Illness: Longstanding history of eczema since childhood with asthma and seasonal allergies during childhood. New diagnosis of Sjogren's Disease (2023) and keratoconjunctivitis sicca.\\n\\n12/10/24\\nPatient presents with persistent eczema on her face, trunk, and extremities. Face with the most severe flare. She has not been seen in office in 10 months. She is currently using several products on her face that may exacerbate eczema including hyaluronic acid and amlactin. She has been out of medications. Will restart Desonide BID x2 weeks on her face. After the 2 weeks, start Zoryve QD x2 weeks. Will add refills of Clobetasol to apply to AA of body BID x2 weeks and then Zoryve QD x2 weeks on the rest of her body. Patient to moisturize face and body with Vanicream BID. Counseled patient on Adbry, which has lower risk of conjunctivitis. Patient will f/u in 3-4 weeks.\\n \\n1/9/24 \\nImproved with topical steroids and zoryve. Rheumatologist cleared patient for starting Adbry. Will submit paperwork. Recheck in 8 and then 16 weeks.\\n\\n3/12/25\\nPatient well controlled with triamcinolone, desonide and her Adbry injections. No flares on exam today. Patient was given Adbry sample in January. Patient approved for Adbry by insurance. Patient to administer next injection at home today. Will discuss decreasing to once monthly at next follow up.\\n\\n6/30/25\\nAtopic dermatitis significantly improved with Adbry. She had decreased to monthly dosing but due to flare on face, will increase to biweekly dosing. Flare on face suspicious for contact dermatitis (possibly to nail cosmetics). Sample of Adbry given to patient in office. Vtama will also be prescribed to be used QD on flares.
Detail Level: Zone

## 2025-06-30 NOTE — PROCEDURE: ADDITIONAL NOTES
Render Risk Assessment In Note?: no
Additional Notes: Pt going on vacation, will treat with LN2 once she returns.
Detail Level: Detailed